# Patient Record
(demographics unavailable — no encounter records)

---

## 2024-10-09 NOTE — CARDIOLOGY SUMMARY
[de-identified] : MCOT:  BPM, AVG HR: 50 BPM, PVC/PAC burden 1%, 0 pt triggered, SR with V TACH 4 beats 175 bpm on 3/31 at 9:43am

## 2024-10-09 NOTE — HISTORY OF PRESENT ILLNESS
[FreeTextEntry1] : pt with CTA : CAC zero, normal coronaries, PREDM, HTN, HLD, HYPOTHYROID, elevated lvfp. WCH. NSVT in , LAE, no afib MCOT   pt with BRCA gene s/p hysterectomy. carotid: moderate intraluminal atherosclerosis  BP CUFF ACCURATE   a1C 6.3 tc: 224, tg 161 2019   3/27/23:  pt says she is not nervous and hr is higher side, pt says when drives she feels feels like heart is racing and going fast, pt did not bring cuff, pt has ELEVATED LVFP AND MILD LAE so ? afib.  pt  A1c: 6.  pt denies sob going up 13 stairs daily.   23: MCOT:  BPM, AVG HR: 50 BPM, PVC/PAC burden 1%, 0 pt triggered, SR with V TACH 4 beats 175 bpm on 3/31 at 9:43am  : LDL: 112 HDL 58  GFR: 82 A1c: 6.2  pt had NSVT 4 beats on monitor and denies palpitations  pt denies cp but walks at home upstairs with no problems, denies syncope.  10 YEAR ASCVD: 27% AND ADVISED TO TAKE STATIN.   10/31/23:  10/23/23: HGB: 11.8, T, HDL: 56, LDL: 50, A1C: 6.5, BNP: <36 23: NUCLEAR: NEGATIVE FOR ISCHEMIA:  LVEF is greater than 55%. 23: EF: 63%, E' sept: 0.07, CO: 7, DD1 pt lvfp is improved and LAE improved vs past, pt sob is improved vs last visit. pt stress is negative for ischemia.   24:  24: CTA normal coronaries, CAC zero  24: A1C: 6.4, TSH: 4.37, T, HDL: 56, LDL: 97, BNP: 78, a1c is improved.  LDL still controlled on 5 mg po qhs  pt says driving feels a panic attack like going to pass out. pt says it occurred today with driving and 2 months ago.  pt CTA is normal. pt to see neurology, pt says heart racing started only recently and almost passed out.   3/13/24: Patient canceled appointment with Dr. Araujo.   24: 24: BNP: 44, HDL: 57, T, LDL: 60, A1C: 6.3 24: Carotid Moderate intraluminal atherosclerosis b/l Ica. pt had sensation of feeling like passing out while driving in  but canceled appointment with dr. araujo  neurology f/u?  pt doing yoga, pt had another episode when driving 2 weeks ago and did not see dr. araujo because was not arond.   10/9/24: NSH ON 10/3/24: 24: PLT: 146, K: 5.8 HEMOLYZED, BNP: 45, HDL: 59, T, LDL: 103, A1C: 6.4 24: EF: 50-55%, GLS: -20.1%, e' sept: 0.08 m/s, E/e': 11, lvot vti: 27.8 cm. DD1, mild LAE, mild MR appreciate EP note Dr Araujo no indication for ILR at this time. repeat K: 4.7 pt walked to office and bp high, pt was walking fast to office. pt has no cp or sob and no episodes of feeling like passing out.  pt says neurologist says everything was normal.

## 2024-10-09 NOTE — DISCUSSION/SUMMARY
[FreeTextEntry1] : NSVT, stress negative, high 10 year risk CTA normal 2024  if palpitations worsen, beta blockade  continue rosuvastatin to 5 mg po qhs.  cont losartan/hctz 50/12.5 mg daily   pt symptoms happened with MCOT so need ILR in 2024.  no episdsodes of feeling like passing out for 4 months, neuro w/u negative pt can resume driving  bloodwork o f/u in 4 months

## 2024-10-09 NOTE — CARDIOLOGY SUMMARY
[de-identified] : MCOT:  BPM, AVG HR: 50 BPM, PVC/PAC burden 1%, 0 pt triggered, SR with V TACH 4 beats 175 bpm on 3/31 at 9:43am

## 2025-03-03 NOTE — END OF VISIT
[FreeTextEntry3] : Documented by Yanira Penaloza acting as a scribe for Benji Singh on 03/03/2025   All medical record entries made by the Scribe were at my, Dr. Benji Singh direction and personally dictated by me on 03/03/2025 . I have reviewed the chart and agree that the record accurately reflects my personal performance of the history, physical exam, assessment and plan. I have also personally directed, reviewed, and agreed with the chart.

## 2025-03-03 NOTE — ASSESSMENT
[FreeTextEntry1] : Ms. Tammi Thomas is a very pleasant 72-year-old female with past medical history of hypertension, prediabetes, hyperlipidemia, hypothyroidism, NSVT, BRCA gene status post hysterectomy, and carotid stenosis who presents today for cardiovascular follow-up.

## 2025-03-03 NOTE — DISCUSSION/SUMMARY
[FreeTextEntry1] : 1.  HTN  - Recommend continuing losartan-hctz 50mg-12.5mg po daily for HTN - The patient reports erratic blood pressures at home. - I have asked the patient to record a blood pressure log at home and bring her blood pressure machine and her log with her at her next visit to see if a change in antihypertensive medication regimen is indicated.  2.  Hyperlipidemia Recommend continue rosuvastatin 5 mg p.o. daily for hyperlipidemia Will check her fasting the panel at her next visit to further assess her LDL.  3.  NSVT The patient's echocardiogram demonstrated normal LV function and a recent coronary CT demonstrated no CAD. She was seen by electrophysiology who said no further workup was needed.  I will follow-up with the patient in 4 to 6 weeks to go over her blood pressure sooner if she does develops any new or worsening symptoms. [EKG obtained to assist in diagnosis and management of assessed problem(s)] : EKG obtained to assist in diagnosis and management of assessed problem(s)

## 2025-03-03 NOTE — HISTORY OF PRESENT ILLNESS
[FreeTextEntry1] : Ms. Tammi Thomas is a very pleasant 72-year-old female with past medical history of hypertension, prediabetes, hyperlipidemia, hypothyroidism, NSVT, BRCA gene status post hysterectomy, and carotid stenosis who presents today for cardiovascular follow-up.  The patient has no chest pain, dyspnea, palpitations, or dizziness.  The patient was previously seen by EP for dizziness and underwent an MCOT that demonstrated no significant arrythmias - ILR therefore was not performed.  Her dizziness has resolved and denies any syncope or near syncope.

## 2025-03-03 NOTE — ASSESSMENT
[FreeTextEntry1] : 72-year-old female with grade four hemorrhoids.   I spoke to the patient regarding her condition I recommended an examination under anesthesia and excisional hemorrhoidectomy.  All risks, benefits, and alternatives were discussed including bleeding, infection, and damage to the sphincter complex resulting in a degree of incontinence to gas or stool in recurrence. Patient expressed understanding and was in agreement with the plan.

## 2025-03-03 NOTE — HISTORY OF PRESENT ILLNESS
[FreeTextEntry1] : Patient is a 72-year-old female with a PMHx of HTN, HLD, CHF, hypothyroidism, and PSHx of thyroidectomy for benign disease, and carpal tunnel surgery, who presents for evaluation of hemorrhoids. Patient reports long-standing hemorrhoid symptoms. She reports bleeding with her bowel movements. She has bowel movements once daily without constipation or diarrhea. She denies recent fevers, chills, nausea, or vomiting. She denies a family history of colon cancer, rectal cancer, or inflammatory bowel disease. Her last colonoscopy was in 2022 with Dr. Trujillo, we do not have those results.

## 2025-03-03 NOTE — PHYSICAL EXAM
[FreeTextEntry1] : General: Awake, Alert, No Acute Distress Respiratory: Normal Respiratory Effort  Rectal: External examination shows grade four hemorrhoids.

## 2025-03-03 NOTE — CARDIOLOGY SUMMARY
[de-identified] : 03/03/25: NSR, no significant ST-T changes  [de-identified] : MCOT:  BPM, AVG HR: 50 BPM, PVC/PAC burden 1%, 0 pt triggered, SR with V TACH 4 beats 175 bpm on 3/31 at 9:43am

## 2025-03-03 NOTE — ADDENDUM
[FreeTextEntry1] :  I, Yanira Penaloza ([facility]) assisted in filling out this chart under the dictation of Benji Singh on 03/03/2025

## 2025-03-03 NOTE — PROCEDURE
[FreeTextEntry1] : Digital rectal exam and anoscopy shows normal sphincter tone, large internal hemorrhoids, and no masses. Patient tolerated the procedure well.

## 2025-04-18 NOTE — HISTORY OF PRESENT ILLNESS
[FreeTextEntry1] : Patient is a 72-year-old female with a PMHx of HTN, HLD, CHF, hypothyroidism, and PSHx of thyroidectomy for benign disease, and carpal tunnel surgery who presents for s/p excisional hemorrhoidectomy x2 on 03/28/2025. Pathology was consistent with hemorrhoids. Patient presents today for follow-up. Patient states she has minimal pain. She has some minor drainage and bleeding from the area, particularly with bowel movements. She otherwise feels well. She denies recent fevers, chills, nausea, or vomiting. She denies a family history of colon cancer, rectal cancer, or inflammatory bowel disease. Her last colonoscopy was in 2022 with Dr. Trujillo, we do not have those results.

## 2025-04-18 NOTE — END OF VISIT
[FreeTextEntry3] : All medical record entries made by my scribe were at my, Dr. Benji Singh, direction and personally dictated by me. I have reviewed the chart and agree that the record accurately reflects my personal performance of the history, physical exam, assessment and plan. I have also personally directed, reviewed, and agreed with the chart.

## 2025-04-18 NOTE — PHYSICAL EXAM
[FreeTextEntry1] : General: Awake, Alert, NAD   Respiratory: Normal Respiratory Effort  Rectal: External examination showed two healing right sided excisional hemorrhoidectomy sites with no evidence of erythema, induration, or purulence.

## 2025-04-18 NOTE — ASSESSMENT
[FreeTextEntry1] : 72-year-old female with grade IV hemorrhoids s/p excisional hemorrhoidectomy x2.    Patient is recovering well from surgery. Recommend that she continue to keep the area clean and dry. She will continue with a high fiber diet and fiber supplementation. Will see her back in 3 months.

## 2025-04-18 NOTE — ADDENDUM
[FreeTextEntry1] :  IHailey assisted in documentation on 04/10/2025 acting as a scribe for Dr. Benji Singh.

## 2025-04-29 NOTE — DISCUSSION/SUMMARY
[FreeTextEntry1] : 1.  HTN  - Recommend continuing losartan-hctz 50mg-12.5mg po daily for HTN - The patient reports normal blood pressures at home. - I have asked the patient to continue to record a blood pressure log at home and bring her blood pressure machine and her log with her at her next visit to see if a change in antihypertensive medication regimen is indicated.  2.  Hyperlipidemia Recommend continue rosuvastatin 5 mg p.o. daily for hyperlipidemia I have asked her to forward her upcoming bloodwork to our office for further review   3.  NSVT The patient's echocardiogram demonstrated normal LV function and a recent coronary CT demonstrated no CAD. She was seen by electrophysiology who said no further workup was needed.  I will follow-up with the patient in 4 to 6 months or sooner if she does develops any new or worsening symptoms. [EKG obtained to assist in diagnosis and management of assessed problem(s)] : EKG obtained to assist in diagnosis and management of assessed problem(s)

## 2025-04-29 NOTE — HISTORY OF PRESENT ILLNESS
[FreeTextEntry1] : Ms. Tammi Thomas is a very pleasant 72-year-old female with past medical history of hypertension, prediabetes, hyperlipidemia, hypothyroidism, NSVT, BRCA gene status post hysterectomy, and carotid stenosis who presents today for cardiovascular follow-up.  The patient has no chest pain, dyspnea, palpitations, or dizziness. Her blood pressures have been normal and stable at home.

## 2025-04-29 NOTE — CARDIOLOGY SUMMARY
[de-identified] : 04/29/25: NSR, no ischemic st-t changes  03/03/25: NSR, no significant ST-T changes  [de-identified] : MCOT:  BPM, AVG HR: 50 BPM, PVC/PAC burden 1%, 0 pt triggered, SR with V TACH 4 beats 175 bpm on 3/31 at 9:43am

## 2025-07-21 NOTE — PHYSICAL EXAM
[FreeTextEntry1] : General: Awake, Alert, NAD   Respiratory: Normal Respiratory Effort  Rectal: external examination shows two healed right-sided excisional hemorrhoidectomy sites.

## 2025-07-21 NOTE — ASSESSMENT
[FreeTextEntry1] : 72-year-old female with grade four hemorrhoid status was excisional hemorrhoidectomy x2.  The patient has recovered well from surgery. Recommend she continue with a high fiber diet, fiber supplementation, and increased water intake. We will see her back as needed.

## 2025-07-21 NOTE — END OF VISIT
Pre-visit chart audit complete     [FreeTextEntry3] : All medical record entries made by the Scribe were at my, Dr. Benji Singh, direction and personally dictated by me. I have reviewed the chart and agree that the record accurately reflects my personal performance of the history, physical exam, assessment, and plan. I have also personally directed, reviewed, and agreed with the chart.

## 2025-07-21 NOTE — END OF VISIT
[FreeTextEntry3] : All medical record entries made by the Scribe were at my, Dr. Benji Singh, direction and personally dictated by me. I have reviewed the chart and agree that the record accurately reflects my personal performance of the history, physical exam, assessment, and plan. I have also personally directed, reviewed, and agreed with the chart.